# Patient Record
(demographics unavailable — no encounter records)

---

## 2025-07-22 NOTE — ASSESSMENT
[Vaccines Reviewed] : Immunizations reviewed today. Please see immunization details in the vaccine log within the immunization flowsheet.  [FreeTextEntry1] : #HCM -pap smear UTD, will be having colpo for abnormal pap -flu shot, covid, Tdap UTD -f/u labs  #Anxiety -fluoxetine refilled

## 2025-07-22 NOTE — HISTORY OF PRESENT ILLNESS
[FreeTextEntry1] : CPE [de-identified] : 23 F PMH anxiety, acne presents for CPE. Takes 15 mg fluoxetine. 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  Had pap showed abnormal cells, colpo scheduled.

## 2025-07-22 NOTE — HISTORY OF PRESENT ILLNESS
[FreeTextEntry1] : NPA- Acne [de-identified] : Bina Chatterjee 22 y/o F presents for acne  recently moved from [place] she is an RN at [employer] after transitioning to [employer], acne has been flaring  IUD progesterone placed summer 2022, acne started flaring in the fall so she started doxy, clinda and tretinoin tried Seysara for a few months d/t photosensitivity also tried Winlevi has been on spironolactone 100mg daily for the past 1.5 years  Personal history of skin cancer: no Family history of skin cancer: no History of blistering sunburns: no History of tanning bed use: no Uses sunscreen regularly: yes

## 2025-07-22 NOTE — ASSESSMENT
[FreeTextEntry1] : #Acne vulgaris, cystic and scarring -chronic, flaring - I discussed the chronic nature and course of this condition -discussed therapeutic options including further antibiotic treatment and isotretinoin -discussed side effects of isotretinoin, including but not limited to: hair loss, xerosis, chelitis, bone pain/joint pain and inflammation, sun sensitivity, pseudotumor cerebri, headache, dry eyes, vision changes, increased lipid and triglyceride levels, liver inflammation and liver damage, pancreatitis, hematologic abnormalities, the association that has been reported with depression, crohns disease/ IBD.  No donating blood or sharing medications. -Patient was counseled regarding pregnancy prevention and she is aware of the risk of birth defect while on isotretinoin.  She pledges to use two methods of birth control for at least 1 month after stopping isotretinoin. They are: 1. hormonal IUD and 2. male condoms. -patient has read isotretinoin handout and signed consent form -while waiting 30 days, can try dapsone gel and sodium sulfacetamide wash  #High risk medication use -labs today: CBC, CMP, lipid panel urine POCT pregnancy test negative -repeat pregnancy test in 1 month then start isotretinoin -patient weight: -ipledge ID: [id number]   RTC 1 month

## 2025-07-22 NOTE — HEALTH RISK ASSESSMENT
[Yes] : Yes [2 - 4 times a month (2 pts)] : 2-4 times a month (2 points) [1 or 2 (0 pts)] : 1 or 2 (0 points) [Never (0 pts)] : Never (0 points) [Single] : single [Sexually Active] : sexually active [Never] : Never [0-4] : 0-4 [de-identified] : roommate